# Patient Record
Sex: FEMALE | Race: WHITE | NOT HISPANIC OR LATINO | URBAN - METROPOLITAN AREA
[De-identification: names, ages, dates, MRNs, and addresses within clinical notes are randomized per-mention and may not be internally consistent; named-entity substitution may affect disease eponyms.]

---

## 2021-09-26 ENCOUNTER — EMERGENCY (EMERGENCY)
Facility: HOSPITAL | Age: 51
LOS: 1 days | Discharge: HOME | End: 2021-09-26
Attending: STUDENT IN AN ORGANIZED HEALTH CARE EDUCATION/TRAINING PROGRAM
Payer: SELF-PAY

## 2021-09-26 VITALS
WEIGHT: 139.99 LBS | OXYGEN SATURATION: 99 % | DIASTOLIC BLOOD PRESSURE: 71 MMHG | SYSTOLIC BLOOD PRESSURE: 133 MMHG | HEIGHT: 64 IN | RESPIRATION RATE: 19 BRPM | HEART RATE: 68 BPM | TEMPERATURE: 98 F

## 2021-09-26 VITALS
SYSTOLIC BLOOD PRESSURE: 132 MMHG | HEART RATE: 70 BPM | OXYGEN SATURATION: 99 % | RESPIRATION RATE: 18 BRPM | TEMPERATURE: 98 F | DIASTOLIC BLOOD PRESSURE: 70 MMHG

## 2021-09-26 PROCEDURE — 29125 APPL SHORT ARM SPLINT STATIC: CPT

## 2021-09-26 PROCEDURE — 99283 EMERGENCY DEPT VISIT LOW MDM: CPT | Mod: 25

## 2021-09-26 PROCEDURE — 73130 X-RAY EXAM OF HAND: CPT | Mod: 26,RT

## 2021-09-26 PROCEDURE — 73130 X-RAY EXAM OF HAND: CPT

## 2021-09-26 RX ORDER — ACETAMINOPHEN 500 MG
975 TABLET ORAL ONCE
Refills: 0 | Status: COMPLETED | OUTPATIENT
Start: 2021-09-26 | End: 2021-09-26

## 2021-09-26 RX ORDER — IBUPROFEN 200 MG
600 TABLET ORAL ONCE
Refills: 0 | Status: COMPLETED | OUTPATIENT
Start: 2021-09-26 | End: 2021-09-26

## 2021-09-26 RX ADMIN — Medication 975 MILLIGRAM(S): at 09:26

## 2021-09-26 RX ADMIN — Medication 975 MILLIGRAM(S): at 11:24

## 2021-09-26 RX ADMIN — Medication 600 MILLIGRAM(S): at 09:26

## 2021-09-26 RX ADMIN — Medication 600 MILLIGRAM(S): at 11:24

## 2021-09-26 NOTE — ED ADULT NURSE NOTE - OBJECTIVE STATEMENT
50 y/o arrives to the ER complaining of finger injury. Pt has not pertinent medical history.  Pt is A&Ox4, speaking coherently. Pt states holding groceries bag yesterday,  bag weight twisted R hand externally, producing pain, swelling between thumb and index finger. PT reports not able to make a full fist.  Pt denies any numbness or tingling. Peripheral pulses are strong and equal in bilateral extremities. Pt has equal ROM in extremities. Bruising and  swelling present in R hand. Iced pack offered. On assessment airway is patent, breathing spontaneously and unlabored. Skin is dry, warm and color appropriate to race. Comfort measured provided., side rails up with bed locked and in lowest position for safety. call bell within reach. [place] provided. Comfort and safety provided. Educated not to ambulate without assistance. will continue to reassess.

## 2021-09-26 NOTE — ED PROVIDER NOTE - NSFOLLOWUPINSTRUCTIONS_ED_ALL_ED_FT
Ice to pain area for 2days; every 2hours for 20minutes.    Elevate the affected hand.    Keep the chin tapes and wrist splint as directed.    Take Ibuprofen or Tylenol for pain as package directed.    Follow up with hand specialist Dr. Guan, call Monday for appointment.    Return for any concerns or worsening symptoms.    Please see the information of hand sprain.                                                            Intermetacarpal Sprain       An intermetacarpal sprain happens when ligaments between bones in the hand (metacarpal bones) become overstretched or torn (ruptured). Ligaments are tissues that connect bones to each other. A sprain usually happens because of an injury to the hand.    Intermetacarpal sprains range from mild to severe. With proper treatment, these injuries may take 2–12 weeks to heal.      What are the causes?    This injury is caused by too much pressure or strain (stress) being applied to the intermetacarpal ligaments. This often happens because of a hard, direct hit or injury (trauma) to the hand.      What increases the risk?  The following factors may make you more likely to develop this injury:  •A previous hand injury.      •Doing repetitive motions with your hands, such as movements in sports or heavy labor.      •Having poor strength and flexibility in your hands.        What are the signs or symptoms?  Symptoms of this injury may include:  •A feeling of popping or tearing inside the hand.      •Pain and swelling, especially in the knuckles.      •Bruising.      •Limited range of motion of the hand.        How is this diagnosed?    This injury is diagnosed based on a physical exam and your medical history. You may have X-rays to check for breaks (fractures) in your bones.  Your sprain may be rated in degrees, based on how severe it is. The ratings include:  •First-degree. A ligament is stretched, but it still has its normal shape.      •Second-degree. A ligament is partially ruptured, and you may have some difficulty moving your hand normally.      •Third-degree. A ligament is completely ruptured, and you may not be able to move the affected hand.        How is this treated?  This injury is treated by resting, icing, applying pressure to (compression), and raising (elevating) the injured area. This is called RICE therapy. Depending on the severity of your sprain, treatment may also include:  •Medicines that help to relieve pain.      •Keeping your hand in a fixed position (immobilization) for a period of time. This may be done using a bandage (dressing), a cast, or a splint.      •Exercises to strengthen and stretch your hand. You may be referred to a physical therapist.      •Surgery.        Follow these instructions at home:    If you have a cast:     • Do not stick anything inside the cast to scratch your skin. Doing that increases your risk of infection.      •Check the skin around the cast every day. Tell your health care provider about any concerns.      •You may put lotion on dry skin around the edges of the cast. Do not put lotion on the skin underneath the cast.      •Keep the cast clean and dry.      If you have a splint:     •Wear the splint as told by your health care provider. Remove it only as told by your health care provider.      •Loosen the splint if your fingers tingle, become numb, or turn cold and blue.      •Keep the splint clean and dry.      Bathing     •If you have a cast, splint, or dressing, do not take baths, swim, or use a hot tub until your health care provider approves.    •If the cast, splint, or dressing is not waterproof:  •Do not let it get wet.      •Cover it with a watertight covering when you take a bath or shower.          Managing pain, stiffness, and swelling    •If directed, put ice on the injured area.  •If you have a removable splint, remove it as told by your health care provider.      •Put ice in a plastic bag.      •Place a towel between your skin and the bag.      •Leave the ice on for 20 minutes, 2–3 times a day.        •Move your fingers often to reduce stiffness and swelling.      •Elevate your hand above the level of your heart while you are sitting or lying down.      •Wear a compression wrap only as told by your health care provider.      Activity     •Return to your normal activities as told by your health care provider. Ask your health care provider what activities are safe for you.      •Avoid activities that cause pain or make your condition worse.      •Ask your health care provider when it is safe to drive if you have a cast or splint on your hand.      •Do exercises as told by your health care provider or physical therapist.      General instructions     •Take over-the-counter and prescription medicines only as told by your health care provider.    •Ask your health care provider if the medicine prescribed to you:  •Requires you to avoid driving or using heavy machinery.    •Can cause constipation. You may need to take actions to prevent or treat constipation, such as:  •Drink enough fluid to keep your urine pale yellow.      •Take over-the-counter or prescription medicines.       • Eat foods that are high in fiber, such as beans, whole grains, and fresh fruits and vegetables.       •Limit foods that are high in fat and processed sugars, such as fried or sweet foods.          •If you have a cast or a splint, do not put pressure on any part of the cast or splint until it is fully hardened. This may take several hours.      • Do not wear rings on the fingers of your injured hand.      •Keep all follow-up visits as told by your health care provider. This is important.        Contact a health care provider if:    •You have symptoms that do not get better after 2 weeks of treatment.      •You have increased redness, swelling, or pain in your injured area.      •You have a fever.      •Your cast or splint gets damaged.        Get help right away if:    •You have severe pain.      •You develop numbness in your hand or fingers.      •You cannot move your hand or fingers.      •Your hand or fingers feel unusually cold.      •Your hand or fingers turn blue.      •Your fingernails turn a dark color, such as blue or gray.        Summary    •An intermetacarpal sprain happens when ligaments between bones in the hand (metacarpal bones) become overstretched or torn.      •This injury often happens because of a hard, direct hit or injury to the hand.      •Treatment includes resting, icing, applying pressure to (compression), and raising (elevating) the injured area.      •Immobilization and medicines may also be needed.      This information is not intended to replace advice given to you by your health care provider. Make sure you discuss any questions you have with your health care provider.

## 2021-09-26 NOTE — ED PROVIDER NOTE - OBJECTIVE STATEMENT
51F p/w right index finger pain/swelling after trying to catch a falling shopping bag causing extension injury to finger.  Tried motrin and ice with some relief yesterday, but pain and swelling persisted.  Pt is right handed.  No other injuries.

## 2021-09-26 NOTE — ED PROVIDER NOTE - PHYSICAL EXAMINATION
NAD, VSS, Afebrile, Lungs clear. + Right hand; swelling, tender and diminished flexion on 2nd MCP. N/V-intact.

## 2021-09-26 NOTE — ED PROVIDER NOTE - CLINICAL SUMMARY MEDICAL DECISION MAKING FREE TEXT BOX
51F p/w right index finger pain/swelling after trying to catch a falling shopping bag causing extension injury to finger.  Tried motrin and ice with some relief yesterday, but pain and swelling persisted.  Pt is right handed.  No other injuries.  Pt with swelling and ttp of right index finger MCP, patient able to actively flex and extend at MCP/PIP/DIP, good cap refill, sensation intact.  Concern for fx - plan for xray, pain control and reassess.  - Christal Earl, DO

## 2021-09-26 NOTE — ED PROVIDER NOTE - CARE PROVIDER_API CALL
Tulio Guan)  Plastic Surgery  [address] [place] [address], [address]  [place], [place] [id number]  Phone: [phone]  Fax: [phone]  Follow Up Time:     Robbie Linares)  Plastic Surgery; Surgery; Surgical Critical Care  [address] [place] [address]  Phone: [phone]  Fax: [phone]  Follow Up Time:

## 2022-06-27 NOTE — ED PROVIDER NOTE - CPE EDP NEURO NORM
Relevant Problems   No relevant active problems       Anesthetic History   No history of anesthetic complications            Review of Systems / Medical History  Patient summary reviewed and pertinent labs reviewed    Pulmonary            Asthma : well controlled       Neuro/Psych   Within defined limits           Cardiovascular  Within defined limits                     GI/Hepatic/Renal  Within defined limits              Endo/Other  Within defined limits           Other Findings              Physical Exam    Airway  Mallampati: II  TM Distance: > 6 cm  Neck ROM: normal range of motion   Mouth opening: Normal     Cardiovascular  Regular rate and rhythm,  S1 and S2 normal,  no murmur, click, rub, or gallop             Dental  No notable dental hx       Pulmonary  Breath sounds clear to auscultation               Abdominal  GI exam deferred       Other Findings            Anesthetic Plan    ASA: 2  Anesthesia type: MAC          Induction: Intravenous  Anesthetic plan and risks discussed with: Patient
normal...

## 2022-11-04 NOTE — ED PROVIDER NOTE - PATIENT PORTAL LINK FT
65 You can access the FollowMyHealth Patient Portal offered by [facility] by registering at the following website: [email or web address]. By joining [employer]’s FollowMyHealth portal, you will also be able to view your health information using other applications (apps) compatible with our system.